# Patient Record
Sex: MALE | Race: WHITE | NOT HISPANIC OR LATINO | Employment: FULL TIME | URBAN - METROPOLITAN AREA
[De-identification: names, ages, dates, MRNs, and addresses within clinical notes are randomized per-mention and may not be internally consistent; named-entity substitution may affect disease eponyms.]

---

## 2017-03-08 ENCOUNTER — OFFICE VISIT (OUTPATIENT)
Dept: SURGERY | Facility: CLINIC | Age: 47
End: 2017-03-08

## 2017-03-08 VITALS — HEART RATE: 76 BPM | HEIGHT: 75 IN | BODY MASS INDEX: 23.62 KG/M2 | OXYGEN SATURATION: 97 % | WEIGHT: 190 LBS

## 2017-03-08 DIAGNOSIS — K62.5 RECTAL BLEED: Primary | ICD-10-CM

## 2017-03-08 PROCEDURE — 99204 OFFICE O/P NEW MOD 45 MIN: CPT | Performed by: SURGERY

## 2017-03-08 RX ORDER — SODIUM CHLORIDE 0.9 % (FLUSH) 0.9 %
1-10 SYRINGE (ML) INJECTION AS NEEDED
Status: CANCELLED | OUTPATIENT
Start: 2017-03-08

## 2017-03-08 NOTE — PROGRESS NOTES
Chief Complaint   Patient presents with   • Rectal Bleeding     NP, presents w/ slight rectal bleeding, needs to schedule Colonoscopy       Subjective   Jean Pierre Matt is a 46 y.o. male who presents for evaluation of rectal bleeding.  He says that over the past couple of months he's had some on and off bouts of bright red blood predominantly on the toilet paper, but occasionally with a few drips into the bowl as well.  Initially he hadn't thought much about this but now has been bothering him more.  He does have a history of a colon polyp in the past and his last colonoscopy was about 7-1/2 years ago.  He has some occasional very mild rectal pain when he has bouts of constipation, but for the most part his bowels work pretty regularly for him.  He certainly has times intermittently where his bowels were functioning normally and he will have no pain and seen no blood.  He has occasionally taken some stool softeners which she does says helps this constipation.      The following portions of the patient's history were reviewed and updated as appropriate: allergies, current medications, past family history, past medical history, past social history and past surgical history.     Past Medical History   Diagnosis Date   • Colon polyp [id number]       Past Surgical History   Procedure Laterality Date   • Vasectomy Bilateral 01/23/2012     Bilateral vasectomy, exploration of undescended testis-Dr. Tracey Meyers   • Skin lesion excision Right 06/02/2004     Excision of pigmented lesion, right forehead, with layered wound closure-Dr. Yury Santiago   • Endoscopy and colonoscopy N/A 2009     recommendation repeat in 7 years, Dr. Gonzalez Lantigua @ [facility]         Current Outpatient Prescriptions:   •  Ascorbic Acid (VITAMIN C PO), Take 1,000 mg by mouth Daily., Disp: , Rfl:   •  GARCINIA CAMBOGIA-CHROMIUM PO, Take 1 tablet by mouth Daily., Disp: , Rfl:   •  Glucosamine-Chondroitin-MSM (TRIPLE FLEX PO), Take 1 tablet by mouth Daily., Disp: ,  "Rfl:   •  Nutritional Supplements (JUICE PLUS FIBRE PO), Take 2 tablets by mouth Daily., Disp: , Rfl:     No Known Allergies    History reviewed. No pertinent family history.    Social History     Social History   • Marital status:      Spouse name: N/A   • Number of children: N/A   • Years of education: N/A     Occupational History   • Not on file.     Social History Main Topics   • Smoking status: Never Smoker   • Smokeless tobacco: Never Used   • Alcohol use 0.6 oz/week     1 Standard drinks or equivalent per week      Comment: every 2 weeks   • Drug use: No   • Sexual activity: Defer     Other Topics Concern   • Not on file     Social History Narrative       Review of Systems   Constitutional: Negative for activity change, appetite change and unexpected weight change.   HENT: Negative for nosebleeds and trouble swallowing.    Eyes: Negative for photophobia and redness.   Respiratory: Negative for chest tightness and shortness of breath.    Cardiovascular: Negative for chest pain and leg swelling.   Gastrointestinal: Positive for anal bleeding and blood in stool. Negative for abdominal distention and diarrhea.   Endocrine: Negative for cold intolerance, heat intolerance and polyuria.   Genitourinary: Negative for difficulty urinating and urgency.   Musculoskeletal: Negative for neck pain and neck stiffness.   Skin: Negative for color change and pallor.   Neurological: Negative for dizziness, tremors and light-headedness.   Hematological: Negative for adenopathy. Does not bruise/bleed easily.   Psychiatric/Behavioral: Negative for agitation and hallucinations.       Objective     Visit Vitals   • Pulse 76   • Ht 74.5\" (189.2 cm)   • Wt 190 lb (86.2 kg)   • SpO2 97%   • BMI 24.07 kg/m2       Physical Exam   Constitutional: He is oriented to person, place, and time. He appears well-developed and well-nourished.   HENT:   Head: Normocephalic and atraumatic.   Eyes: Conjunctivae and EOM are normal.   Neck: " Normal range of motion. No tracheal deviation present. No thyromegaly present.   Cardiovascular: Normal rate and regular rhythm.    Pulmonary/Chest: Effort normal. No stridor. No respiratory distress. He has no wheezes.   Abdominal: Soft. Bowel sounds are normal. He exhibits no mass. There is no tenderness. There is no guarding.   Musculoskeletal: Normal range of motion. He exhibits no deformity.   Neurological: He is alert and oriented to person, place, and time.   Skin: Skin is warm and dry.   Psychiatric: He has a normal mood and affect. Judgment normal.       I reviewed the patient's operative report from 2009 which showed only a small benign polyp.    Assessment/Plan   This is a nice 46-year-old gentleman with intermittent rectal bleeding and a history of colon polyps.  His last scope was done more than 7-1/2 years ago and given the fact that this bleeding is been persistent for a couple of months I think he would be appropriate to repeat his colonoscopy.  The patient understands that the risks include, but are not limited to, bleeding, perforation and other problems and he is willing to proceed.  In addition I think that stool softeners and fiber supplementation would be reasonable for him an information in this regard was given to the patient.       Cole Nelson MD  General and Endoscopic Surgery  [facility]    [address], [address]  [place], [place], [address]  P: [phone]  F: [phone]

## 2023-02-14 ENCOUNTER — PREP FOR SURGERY (OUTPATIENT)
Dept: OTHER | Facility: HOSPITAL | Age: 53
End: 2023-02-14
Payer: COMMERCIAL

## 2023-02-14 DIAGNOSIS — Z86.010 HISTORY OF ADENOMATOUS POLYP OF COLON: Primary | ICD-10-CM

## 2023-02-15 PROBLEM — Z86.010 HISTORY OF ADENOMATOUS POLYP OF COLON: Status: ACTIVE | Noted: 2023-02-15

## 2023-04-12 ENCOUNTER — PREP FOR SURGERY (OUTPATIENT)
Dept: OTHER | Facility: HOSPITAL | Age: 53
End: 2023-04-12
Payer: COMMERCIAL

## 2023-04-26 RX ORDER — ZOLPIDEM TARTRATE 5 MG/1
5 TABLET ORAL NIGHTLY
COMMUNITY

## 2023-04-27 ENCOUNTER — ANESTHESIA (OUTPATIENT)
Dept: GASTROENTEROLOGY | Facility: HOSPITAL | Age: 53
End: 2023-04-27
Payer: COMMERCIAL

## 2023-04-27 ENCOUNTER — HOSPITAL ENCOUNTER (OUTPATIENT)
Facility: HOSPITAL | Age: 53
Setting detail: HOSPITAL OUTPATIENT SURGERY
Discharge: HOME | End: 2023-04-27
Attending: SURGERY | Admitting: SURGERY
Payer: COMMERCIAL

## 2023-04-27 ENCOUNTER — ANESTHESIA EVENT (OUTPATIENT)
Dept: GASTROENTEROLOGY | Facility: HOSPITAL | Age: 53
End: 2023-04-27
Payer: COMMERCIAL

## 2023-04-27 VITALS
SYSTOLIC BLOOD PRESSURE: 110 MMHG | RESPIRATION RATE: 16 BRPM | DIASTOLIC BLOOD PRESSURE: 81 MMHG | OXYGEN SATURATION: 95 % | WEIGHT: 193 LBS | BODY MASS INDEX: 24.77 KG/M2 | HEART RATE: 77 BPM | HEIGHT: 74 IN

## 2023-04-27 DIAGNOSIS — Z86.010 HISTORY OF ADENOMATOUS POLYP OF COLON: ICD-10-CM

## 2023-04-27 PROCEDURE — 25010000002 PROPOFOL 10 MG/ML EMULSION: Performed by: ANESTHESIOLOGY

## 2023-04-27 PROCEDURE — 45380 COLONOSCOPY AND BIOPSY: CPT | Performed by: SURGERY

## 2023-04-27 PROCEDURE — 88305 TISSUE EXAM BY PATHOLOGIST: CPT | Performed by: SURGERY

## 2023-04-27 PROCEDURE — S0260 H&P FOR SURGERY: HCPCS | Performed by: SURGERY

## 2023-04-27 RX ORDER — PROPOFOL 10 MG/ML
VIAL (ML) INTRAVENOUS CONTINUOUS PRN
Status: DISCONTINUED | OUTPATIENT
Start: 2023-04-27 | End: 2023-04-27 | Stop reason: SURG

## 2023-04-27 RX ORDER — SODIUM CHLORIDE, SODIUM LACTATE, POTASSIUM CHLORIDE, CALCIUM CHLORIDE 600; 310; 30; 20 MG/100ML; MG/100ML; MG/100ML; MG/100ML
1000 INJECTION, SOLUTION INTRAVENOUS CONTINUOUS
Status: DISCONTINUED | OUTPATIENT
Start: 2023-04-27 | End: 2023-04-27 | Stop reason: HOSPADM

## 2023-04-27 RX ORDER — LIDOCAINE HYDROCHLORIDE 20 MG/ML
INJECTION, SOLUTION INFILTRATION; PERINEURAL AS NEEDED
Status: DISCONTINUED | OUTPATIENT
Start: 2023-04-27 | End: 2023-04-27 | Stop reason: SURG

## 2023-04-27 RX ORDER — PROPOFOL 10 MG/ML
VIAL (ML) INTRAVENOUS AS NEEDED
Status: DISCONTINUED | OUTPATIENT
Start: 2023-04-27 | End: 2023-04-27 | Stop reason: SURG

## 2023-04-27 RX ADMIN — PROPOFOL 100 MG: 10 INJECTION, EMULSION INTRAVENOUS at 08:24

## 2023-04-27 RX ADMIN — Medication 200 MCG/KG/MIN: at 08:24

## 2023-04-27 RX ADMIN — SODIUM CHLORIDE, POTASSIUM CHLORIDE, SODIUM LACTATE AND CALCIUM CHLORIDE 1000 ML: 600; 310; 30; 20 INJECTION, SOLUTION INTRAVENOUS at 08:08

## 2023-04-27 RX ADMIN — LIDOCAINE HYDROCHLORIDE 80 MG: 20 INJECTION, SOLUTION INFILTRATION; PERINEURAL at 08:24

## 2023-04-27 NOTE — DISCHARGE INSTRUCTIONS
For the next 24 hours patient needs to be with a responsible adult.    For 24 hours DO NOT drive, operate machinery, appliances, drink alcohol, make important decisions or sign legal documents.    Start with a light or bland diet if you are feeling sick to your stomach otherwise advance to regular diet as tolerated.    Follow recommendations on procedure report if provided by your doctor.    Call Dr Nelson for problems [phone]    Problems may include but not limited to: large amounts of bleeding, trouble breathing, repeated vomiting, severe unrelieved pain, fever or chills.

## 2023-04-27 NOTE — ANESTHESIA PREPROCEDURE EVALUATION
Anesthesia Evaluation     Patient summary reviewed and Nursing notes reviewed   no history of anesthetic complications:  NPO Solid Status: > 8 hours  NPO Liquid Status: > 8 hours           Airway   Mallampati: II  Dental      Pulmonary - negative pulmonary ROS and normal exam   Cardiovascular - negative cardio ROS and normal exam        Neuro/Psych- negative ROS  GI/Hepatic/Renal/Endo    (+)  GERD,      Musculoskeletal     Abdominal    Substance History      OB/GYN          Other                        Anesthesia Plan    ASA 2     MAC     intravenous induction     Anesthetic plan, risks, benefits, and alternatives have been provided, discussed and informed consent has been obtained with: patient.        CODE STATUS:

## 2023-04-27 NOTE — H&P
Cc: Personal history of colon polyps    History of presenting illness: 53-year-old gentleman presenting for colonoscopy.  There is a history of colon polyps at last colonoscopy done 2009 with finding of adenomatous polyps.    Past medical history: Gastroesophageal reflux disease    Past surgical history: Vasectomy, colonoscopy last done 2009    Medications: Omeprazole, Ambien    Allergies: None known    Social history: Nons    Family history: Negative for colorectal cancer    Physical exam:  Body mass index: 24.8  General: Awake and alert, no distress  Head: Normocephalic, atraumatic  Neck: Supple, trachea midline  Eyes: Extraocular movements intact, no icterus  Respiratory: No use of accessory muscles, good bilateral chest expansion  Abdomen: Soft, benign, no hernia felt  Skin: Warm and dry    Assessment and plan:  -Personal history of colon polyps  -Plan for colonoscopy today, risks include bleeding and perforation, patient agreeable to proceed    Cole Nelson MD  General and Endoscopic Surgery  [facility]    [address], [address]  [place], [place], [address]  P: [phone]  F: [phone]

## 2023-04-27 NOTE — OP NOTE
Operative Note :   MD Jean Pierre Fallon  1970    Procedure Date: 04/27/23    Pre-op Diagnosis:  · Personal history of colon polyp    Post-op Diagnosis:  · Colon polyp    Procedure:   · Colonoscopy to terminal ileum with cold forceps polypectomy    Surgeon: Cole Nelson MD      Anesthesia: MAC    Indications:  · 53-year-old gentleman with history of colon polyps.  Last scope greater than 10 years ago.    Findings:   · Benign-appearing polyp in the mid sigmoid colon    Recommendations:   · To be based on polyp pathology, likely recommend repeat scope in 5 years    Description of procedure:    After obtaining informed consent, patient was brought to the endoscopy suite and was sedated.  Digital rectal exam was undertaken and was unremarkable.  The flexible colonoscope was then introduced through the rectum and passed around the level of the cecum under direct visualization with minimal difficulty.  The ileocecal valve and appendiceal orifice were normal.  The ileocecal valve was intubated and the scope was introduced about 15 cm into the terminal ileum, all of which appeared normal.  Scope was then withdrawn, carefully visualizing all mucosal surfaces.  The remaining terminal ileum, cecum, ascending colon, hepatic flexure, transverse colon, splenic flexure and descending colon were normal.  In the sigmoid colon there was a single small sessile polyp removed with 2 bites of the polypectomy forceps.  The site was hemostatic.  There were no diverticula.  The rectum was unremarkable.  The patient tolerated this well.    Cole Nelson MD  General and Endoscopic Surgery  [facility]    [address], [address]  [place], [place], [address]  P: [phone]  F: [phone]

## 2023-04-27 NOTE — ANESTHESIA POSTPROCEDURE EVALUATION
"Patient: Jean Pierre Matt    Procedure Summary     Date: 04/27/23 Room / Location:  [facility]    Anesthesia Start: 0821 Anesthesia Stop: 0844    Procedure: COLONOSCOPY TO CECUM WITH COLD FORCEP POLYPECTOMY Diagnosis:       History of adenomatous polyp of colon      (History of adenomatous polyp of colon [Z86.010])    Surgeons: Cole Nelson MD Provider: Manuel Kitchen MD    Anesthesia Type: MAC ASA Status: 2          Anesthesia Type: MAC    Vitals  Vitals Value Taken Time   /75 04/27/23 0845   Temp     Pulse 72 04/27/23 0845   Resp 16 04/27/23 0845   SpO2 98 % 04/27/23 0845           Post Anesthesia Care and Evaluation    Patient location during evaluation: bedside  Patient participation: complete - patient participated  Level of consciousness: awake and alert  Pain management: adequate    Airway patency: patent  Anesthetic complications: No anesthetic complications    Cardiovascular status: acceptable  Respiratory status: acceptable  Hydration status: acceptable    Comments: /75 (BP Location: Left arm, Patient Position: Sitting)   Pulse 72   Resp 16   Ht 188 cm (74\")   Wt 87.5 kg (193 lb)   SpO2 98%   BMI 24.78 kg/m²       "

## 2023-04-28 LAB
LAB AP CASE REPORT: NORMAL
PATH REPORT.FINAL DX SPEC: NORMAL
PATH REPORT.GROSS SPEC: NORMAL

## 2023-05-01 ENCOUNTER — TELEPHONE (OUTPATIENT)
Dept: SURGERY | Facility: CLINIC | Age: 53
End: 2023-05-01
Payer: COMMERCIAL

## 2023-05-01 NOTE — TELEPHONE ENCOUNTER
----- Message from Cole Nelson MD sent at 5/1/2023  7:51 AM EDT -----  Please notify the patient his polyp was benign and placed him in recall system for repeat scope in 5 years.  Thank you.

## 2023-05-01 NOTE — PROGRESS NOTES
Please notify the patient his polyp was benign and placed him in recall system for repeat scope in 5 years.  Thank you.

## (undated) DEVICE — SENSR O2 OXIMAX FNGR A/ 18IN NONSTR

## (undated) DEVICE — ADAPT CLN BIOGUARD AIR/H2O DISP

## (undated) DEVICE — KT ORCA ORCAPOD DISP STRL

## (undated) DEVICE — CANN O2 ETCO2 FITS ALL CONN CO2 SMPL A/ 7IN DISP LF

## (undated) DEVICE — SINGLE-USE BIOPSY FORCEPS: Brand: RADIAL JAW 4

## (undated) DEVICE — TUBING, SUCTION, 1/4" X 10', STRAIGHT: Brand: MEDLINE